# Patient Record
Sex: FEMALE | NOT HISPANIC OR LATINO | Employment: OTHER | URBAN - METROPOLITAN AREA
[De-identification: names, ages, dates, MRNs, and addresses within clinical notes are randomized per-mention and may not be internally consistent; named-entity substitution may affect disease eponyms.]

---

## 2023-10-23 ENCOUNTER — APPOINTMENT (OUTPATIENT)
Dept: PRIMARY CARE | Facility: CLINIC | Age: 48
End: 2023-10-23
Payer: COMMERCIAL

## 2023-11-28 ENCOUNTER — APPOINTMENT (OUTPATIENT)
Dept: PRIMARY CARE | Facility: CLINIC | Age: 48
End: 2023-11-28
Payer: COMMERCIAL

## 2023-11-29 ENCOUNTER — OFFICE VISIT (OUTPATIENT)
Dept: PRIMARY CARE | Facility: CLINIC | Age: 48
End: 2023-11-29
Payer: COMMERCIAL

## 2023-11-29 ENCOUNTER — ANCILLARY PROCEDURE (OUTPATIENT)
Dept: RADIOLOGY | Facility: CLINIC | Age: 48
End: 2023-11-29
Payer: COMMERCIAL

## 2023-11-29 VITALS
RESPIRATION RATE: 16 BRPM | OXYGEN SATURATION: 97 % | BODY MASS INDEX: 30.8 KG/M2 | TEMPERATURE: 97.9 F | WEIGHT: 152.8 LBS | DIASTOLIC BLOOD PRESSURE: 57 MMHG | HEIGHT: 59 IN | HEART RATE: 70 BPM | SYSTOLIC BLOOD PRESSURE: 137 MMHG

## 2023-11-29 DIAGNOSIS — M79.672 LEFT FOOT PAIN: ICD-10-CM

## 2023-11-29 DIAGNOSIS — E56.9 VITAMIN DEFICIENCY: ICD-10-CM

## 2023-11-29 DIAGNOSIS — E03.9 HYPOTHYROIDISM, UNSPECIFIED TYPE: Primary | ICD-10-CM

## 2023-11-29 PROCEDURE — 99214 OFFICE O/P EST MOD 30 MIN: CPT | Performed by: NURSE PRACTITIONER

## 2023-11-29 PROCEDURE — 73630 X-RAY EXAM OF FOOT: CPT | Mod: LT,FY

## 2023-11-29 PROCEDURE — 73630 X-RAY EXAM OF FOOT: CPT | Mod: LEFT SIDE | Performed by: RADIOLOGY

## 2023-11-29 RX ORDER — LEVOTHYROXINE SODIUM 100 UG/1
100 TABLET ORAL DAILY
COMMUNITY
End: 2023-11-29 | Stop reason: SDUPTHER

## 2023-11-29 RX ORDER — ESCITALOPRAM OXALATE 5 MG/1
5 TABLET ORAL DAILY
COMMUNITY
Start: 2021-05-17

## 2023-11-29 RX ORDER — ALPRAZOLAM 0.25 MG/1
0.25 TABLET ORAL
COMMUNITY
End: 2024-04-24 | Stop reason: SDUPTHER

## 2023-11-29 RX ORDER — LEVOTHYROXINE SODIUM 125 UG/1
125 TABLET ORAL DAILY
Qty: 90 TABLET | Refills: 0 | Status: SHIPPED | OUTPATIENT
Start: 2023-11-29

## 2023-11-29 RX ORDER — LEVOTHYROXINE SODIUM 125 UG/1
1 TABLET ORAL DAILY
COMMUNITY
Start: 2019-02-14 | End: 2023-11-29 | Stop reason: SDUPTHER

## 2023-11-29 RX ORDER — LEVOTHYROXINE SODIUM 100 UG/1
100 TABLET ORAL DAILY
Qty: 90 TABLET | Refills: 0 | Status: SHIPPED | OUTPATIENT
Start: 2023-11-29

## 2023-11-29 RX ORDER — ASPIRIN 325 MG
50000 TABLET, DELAYED RELEASE (ENTERIC COATED) ORAL WEEKLY
COMMUNITY
Start: 2022-09-30

## 2023-11-29 RX ORDER — METHYLPREDNISOLONE 4 MG/1
TABLET ORAL
Qty: 21 TABLET | Refills: 0 | Status: SHIPPED | OUTPATIENT
Start: 2023-11-29 | End: 2023-12-06

## 2023-11-29 ASSESSMENT — ENCOUNTER SYMPTOMS
FEVER: 0
COUGH: 0
FATIGUE: 0
WHEEZING: 0
LOSS OF SENSATION IN FEET: 0
TROUBLE SWALLOWING: 0
SORE THROAT: 0
SHORTNESS OF BREATH: 0
OCCASIONAL FEELINGS OF UNSTEADINESS: 0
PALPITATIONS: 0
WEAKNESS: 0
CHILLS: 0
DEPRESSION: 0
DIZZINESS: 0
NERVOUS/ANXIOUS: 1
NUMBNESS: 0

## 2023-11-29 ASSESSMENT — PATIENT HEALTH QUESTIONNAIRE - PHQ9
SUM OF ALL RESPONSES TO PHQ9 QUESTIONS 1 AND 2: 0
2. FEELING DOWN, DEPRESSED OR HOPELESS: NOT AT ALL
1. LITTLE INTEREST OR PLEASURE IN DOING THINGS: NOT AT ALL

## 2023-11-29 NOTE — PATIENT INSTRUCTIONS
Today you were seen for foot pain.  Your x-ray radiology report is pending, you will be called with any positive findings.  Rest, ice 15-20 minutes 3-4 times a day.  Start short burst of steroids as directed.  Check labs.   Follow up with PCP 2 weeks with any persisting symptoms, or sooner with any worsening pain or any additional concerns.   If developing any severe pain, fever/chills, extremity weakness proceed to emergency department for further evaluation.

## 2023-11-29 NOTE — PROGRESS NOTES
"Subjective   Patient ID: Julieta Leong is a 48 y.o. female who presents for Foot Pain.    HPI Pt is in office with chronic left foot pain. She states it started 4 months ago 7/2023. Patient states its pain is like walking on her bone and the pain radiates up the ankle. She took motrin with no relief. Pt wants blood work for thyroid levels.    48-year-old female presents today complaining of left foot pain.  She denies any injury or trauma.  She states her foot has been hurting for the past 4 months.  She feels like it is walking on bone.  She has tried foot wraps and stretching with little relief.  She has also tried Motrin with little relief.  She is also requesting refills of her thyroid medication, she states she is due to have her thyroid levels checked.  Patient states that she is UTD on PAP and mammogram    Review of Systems   Constitutional:  Negative for chills, fatigue and fever.   HENT:  Negative for congestion, sore throat and trouble swallowing.    Respiratory:  Negative for cough, shortness of breath and wheezing.    Cardiovascular:  Negative for chest pain and palpitations.   Musculoskeletal:         Foot pain   Neurological:  Negative for dizziness, weakness and numbness.   Psychiatric/Behavioral:  The patient is nervous/anxious.        Objective   /57 Comment: automated  Pulse 70   Temp 36.6 °C (97.9 °F) (Temporal)   Resp 16   Ht 1.499 m (4' 11\")   Wt 69.3 kg (152 lb 12.8 oz)   SpO2 97%   BMI 30.86 kg/m²     Physical Exam  Vitals and nursing note reviewed.   Constitutional:       General: She is not in acute distress.     Appearance: Normal appearance.   Neck:      Thyroid: No thyroid mass, thyromegaly or thyroid tenderness.   Cardiovascular:      Rate and Rhythm: Normal rate and regular rhythm.      Heart sounds: Normal heart sounds.   Pulmonary:      Effort: Pulmonary effort is normal.      Breath sounds: Normal breath sounds.   Musculoskeletal:      Comments: Left foot: No " erythema, edema, ecchymoses or other visible deformity noted.  Tenderness noted over left heel and fifth metatarsal.  Foot range of motion within normal limits.  Pedal pulse 2+, sensation intact, cap refill brisk   Skin:     General: Skin is warm and dry.   Psychiatric:         Mood and Affect: Mood normal.         Behavior: Behavior normal.         Assessment/Plan   Problem List Items Addressed This Visit    None  Visit Diagnoses         Codes    Hypothyroidism, unspecified type    -  Primary E03.9    Relevant Medications    levothyroxine (Synthroid, Levoxyl) 100 mcg tablet    levothyroxine (Synthroid, Levoxyl) 125 mcg tablet    Other Relevant Orders    Tsh With Reflex To Free T4 If Abnormal    Left foot pain     M79.672    Relevant Medications    methylPREDNISolone (Medrol Dospak) 4 mg tablets    Other Relevant Orders    XR foot left 3+ views    Vitamin deficiency     E56.9    Relevant Orders    Vitamin D 25-Hydroxy,Total (for eval of Vitamin D levels)    Vitamin B12    BMI 30.0-30.9,adult     Z68.30        Medications refilled, encouraged to check labs.  X-ray of left foot radiology report is pending.  Will treat with Medrol Dosepak.  Patient requesting refills of Xanax, discussed with patient that I do not prescribe controlled medications.  Will have patient follow-up with Dr. Durant to discuss medication and recheck foot.  Patient verbalized understanding and is in agreement with treatment plan.

## 2023-12-12 ENCOUNTER — APPOINTMENT (OUTPATIENT)
Dept: PRIMARY CARE | Facility: CLINIC | Age: 48
End: 2023-12-12
Payer: COMMERCIAL

## 2023-12-29 ENCOUNTER — APPOINTMENT (OUTPATIENT)
Dept: PRIMARY CARE | Facility: CLINIC | Age: 48
End: 2023-12-29
Payer: COMMERCIAL

## 2024-01-22 ENCOUNTER — APPOINTMENT (OUTPATIENT)
Dept: PRIMARY CARE | Facility: CLINIC | Age: 49
End: 2024-01-22
Payer: COMMERCIAL

## 2024-01-23 ENCOUNTER — APPOINTMENT (OUTPATIENT)
Dept: PRIMARY CARE | Facility: CLINIC | Age: 49
End: 2024-01-23
Payer: COMMERCIAL

## 2024-01-25 ENCOUNTER — APPOINTMENT (OUTPATIENT)
Dept: PRIMARY CARE | Facility: CLINIC | Age: 49
End: 2024-01-25
Payer: COMMERCIAL

## 2024-02-01 ENCOUNTER — OFFICE VISIT (OUTPATIENT)
Dept: PRIMARY CARE | Facility: CLINIC | Age: 49
End: 2024-02-01
Payer: COMMERCIAL

## 2024-02-01 VITALS
BODY MASS INDEX: 30.04 KG/M2 | WEIGHT: 149 LBS | SYSTOLIC BLOOD PRESSURE: 118 MMHG | OXYGEN SATURATION: 97 % | HEIGHT: 59 IN | HEART RATE: 63 BPM | DIASTOLIC BLOOD PRESSURE: 82 MMHG

## 2024-02-01 DIAGNOSIS — E03.8 HYPOTHYROIDISM DUE TO HASHIMOTO'S THYROIDITIS: ICD-10-CM

## 2024-02-01 DIAGNOSIS — M79.672 LEFT FOOT PAIN: ICD-10-CM

## 2024-02-01 DIAGNOSIS — D32.9 MENINGIOMA (MULTI): ICD-10-CM

## 2024-02-01 DIAGNOSIS — M72.2 PLANTAR FASCIITIS OF LEFT FOOT: ICD-10-CM

## 2024-02-01 DIAGNOSIS — E06.3 HYPOTHYROIDISM DUE TO HASHIMOTO'S THYROIDITIS: ICD-10-CM

## 2024-02-01 DIAGNOSIS — Z12.31 ENCOUNTER FOR SCREENING MAMMOGRAM FOR MALIGNANT NEOPLASM OF BREAST: ICD-10-CM

## 2024-02-01 DIAGNOSIS — F41.1 GENERALIZED ANXIETY DISORDER: ICD-10-CM

## 2024-02-01 PROBLEM — E03.9 HYPOTHYROIDISM: Status: ACTIVE | Noted: 2022-10-24

## 2024-02-01 PROBLEM — M25.50 PAIN IN JOINT, MULTIPLE SITES: Status: ACTIVE | Noted: 2022-10-24

## 2024-02-01 PROBLEM — D25.9 UTERINE FIBROID: Status: ACTIVE | Noted: 2024-02-01

## 2024-02-01 PROBLEM — R53.81 MALAISE AND FATIGUE: Status: ACTIVE | Noted: 2022-10-24

## 2024-02-01 PROBLEM — R23.8 LONGITUDINAL RIDGING OF NAIL: Status: ACTIVE | Noted: 2022-10-24

## 2024-02-01 PROBLEM — G56.02 CARPAL TUNNEL SYNDROME OF LEFT WRIST: Status: ACTIVE | Noted: 2022-10-24

## 2024-02-01 PROBLEM — E53.8 VITAMIN B12 DEFICIENCY: Status: ACTIVE | Noted: 2024-02-01

## 2024-02-01 PROBLEM — D64.9 ANEMIA: Status: ACTIVE | Noted: 2024-02-01

## 2024-02-01 PROBLEM — M67.80 TENDINOSIS: Status: ACTIVE | Noted: 2022-10-24

## 2024-02-01 PROBLEM — I82.411 DVT OF DEEP FEMORAL VEIN, RIGHT (MULTI): Status: ACTIVE | Noted: 2022-10-24

## 2024-02-01 PROBLEM — R53.83 MALAISE AND FATIGUE: Status: ACTIVE | Noted: 2022-10-24

## 2024-02-01 PROBLEM — E61.1 IRON DEFICIENCY: Status: ACTIVE | Noted: 2024-02-01

## 2024-02-01 PROBLEM — Z86.59 HISTORY OF OCD (OBSESSIVE COMPULSIVE DISORDER): Status: ACTIVE | Noted: 2024-02-01

## 2024-02-01 PROBLEM — Z86.718 HISTORY OF DVT OF LOWER EXTREMITY: Status: ACTIVE | Noted: 2024-02-01

## 2024-02-01 PROBLEM — R92.2 DENSE BREASTS: Status: ACTIVE | Noted: 2024-02-01

## 2024-02-01 PROBLEM — N60.02 CYST OF LEFT BREAST: Status: ACTIVE | Noted: 2024-02-01

## 2024-02-01 PROBLEM — R92.30 DENSE BREASTS: Status: ACTIVE | Noted: 2024-02-01

## 2024-02-01 PROBLEM — N92.0 MENORRHAGIA: Status: ACTIVE | Noted: 2024-02-01

## 2024-02-01 PROBLEM — R76.8 ANA POSITIVE: Status: ACTIVE | Noted: 2022-10-24

## 2024-02-01 PROBLEM — F41.9 ANXIETY DISORDER: Status: ACTIVE | Noted: 2023-08-01

## 2024-02-01 PROBLEM — N39.41 URGE INCONTINENCE: Status: ACTIVE | Noted: 2024-02-01

## 2024-02-01 PROBLEM — G89.29 CHRONIC FOOT PAIN, LEFT: Status: ACTIVE | Noted: 2023-06-01

## 2024-02-01 PROBLEM — E55.9 VITAMIN D DEFICIENCY: Status: ACTIVE | Noted: 2024-02-01

## 2024-02-01 PROBLEM — I26.99 PULMONARY EMBOLISM (MULTI): Status: ACTIVE | Noted: 2022-10-24

## 2024-02-01 PROCEDURE — 99214 OFFICE O/P EST MOD 30 MIN: CPT | Performed by: FAMILY MEDICINE

## 2024-02-01 RX ORDER — PREDNISONE 10 MG/1
TABLET ORAL
Qty: 30 TABLET | Refills: 0 | Status: SHIPPED | OUTPATIENT
Start: 2024-02-01 | End: 2024-05-25 | Stop reason: WASHOUT

## 2024-02-01 RX ORDER — DICLOFENAC SODIUM 75 MG/1
75 TABLET, DELAYED RELEASE ORAL 2 TIMES DAILY PRN
Qty: 60 TABLET | Refills: 1 | Status: SHIPPED | OUTPATIENT
Start: 2024-02-01 | End: 2024-04-01

## 2024-02-01 NOTE — PROGRESS NOTES
Subjective   Patient ID: Julieta Leong is a 48 y.o. female who presents for Establish Care and Foot Pain.    Pt c/o left foot pain, tingling sensation, pt states when she walks she feels a lump on the side of foot  X 6 months         Review of Systems  12 Systems have been reviewed as follows.  Constitutional: Fever, weight gain, weight loss, appetite change, night sweats, fatigue, chills.  Eyes : blurry, double vision, vision, loss, tearing, redness, pain, sensitivity to light, glaucoma.  Ears, nose, mouth, and throat: Hearing loss, ringing in the ears, ear pain, nasal congestion, nasal drainage, nosebleeds, mouth, throat, irritation tooth problem.  Cardiovascular :chest pain, pressure, heart racing, palpitations, sweating, leg swelling, high or low blood pressure  Pulmonary: Cough, yellow or green sputum, blood and sputum, shortness of breath, wheezing  Gastrointestinal: Nausea, vomiting, diarrhea, constipation, pain, blood in stool, or vomitus, heartburn, difficulty swallowing  Genitourinary: incontinence, abnormal bleeding, abnormal discharge, urinary frequency, urinary hesitancy, pain, impotence sexual problem, infection, urinary retention  Musculoskeletal: Pain, stiffness, joint, redness or warmth, arthritis, back pain, weakness, muscle wasting, sprain or fracture  Neuro: Weight weakness, dizziness, change in voice, change in taste change in vision, change in hearing, loss, or change of sensation, trouble walking, balance problems coordination problems, shaking, speech problem  Endocrine , cold or heat intolerance, blood sugar problem, weight gain or loss missed periods hot flashes, sweats, change in body hair, change in libido, increased thirst, increased urination  Heme/lymph: Swelling, bleeding, problem anemia, bruising, enlarged lymph nodes  Allergic/immunologic: H. plus nasal drip, watery itchy eyes, nasal drainage, immunosuppressed  The above were reviewed and noted negative except as noted in HPI  "and Problem List.    Objective   /82   Pulse 63   Ht 1.499 m (4' 11\")   Wt 67.6 kg (149 lb)   SpO2 97%   BMI 30.09 kg/m²     Physical Exam  Constitutional: Well developed, well nourished, alert and in no acute distress   Eyes: Normal external exam. Pupils equally round and reactive to light with normal accommodation and extraocular movements intact.  Neck: Supple, no lymphadenopathy or masses.   Cardiovascular: Regular rate and rhythm, normal S1 and S2, no murmurs, gallops, or rubs. Radial pulses normal. No peripheral edema.  Pulmonary: No respiratory distress, lungs clear to auscultation bilaterally. No wheezes, rhonchi, rales.  Abdomen: soft,non tender, non distended, without masses or HSM  Skin: Warm, well perfused, normal skin turgor and color.   Neurologic: Cranial nerves II-XII grossly intact.   Psychiatric: Mood calm and affect normal  Musculoskeletal: Moving all extremities without restriction    Assessment/Plan   Problem List Items Addressed This Visit             ICD-10-CM    Anxiety disorder F41.9    Relevant Orders    Follow Up In [facility] - PCP - Established    Hypothyroidism E03.9    Relevant Orders    Follow Up In [facility] - PCP - Established    Meningioma (CMS/HCC) D32.9    Encounter for screening mammogram for malignant neoplasm of breast Z12.31    Relevant Orders    BI mammo bilateral screening tomosynthesis    Follow Up In [facility] - PCP - Established     Other Visit Diagnoses         Codes    Left foot pain     M79.672    Relevant Medications    predniSONE (Deltasone) 10 mg tablet    diclofenac (Voltaren) 75 mg EC tablet    Other Relevant Orders    Follow Up In [facility] - PCP - Established    Plantar fasciitis of left foot     M72.2               Continue current medications and therapy for chronic medical conditions    Xray on left foot 11/25/23 wnl     Plantars fasciitis  Foot excercises/stretches discussed.   Ice the area " PRN.    Hypothyroidism is treated with levothyroxine 225 mg.    Start prednisone taper. When at the 1 tablet per day of the taper, start the diclofenac.     Mammogram ordered today.                Provider Attestation - Scribe documentation  All medical record entries made by the Scribe were at my direction and personally dictated by me. I have reviewed the chart and agree that the record accurately reflects my personal performance of the history, physical exam, discussion and plan.    Scribe Attestation  By signing my name below, I, Ronn Durant MD   , Scribe attest that this documentation has been prepared under the direction and in the presence of Ronn Durant MD.

## 2024-04-24 ENCOUNTER — PATIENT MESSAGE (OUTPATIENT)
Dept: PRIMARY CARE | Facility: CLINIC | Age: 49
End: 2024-04-24
Payer: COMMERCIAL

## 2024-04-24 DIAGNOSIS — F41.1 GENERALIZED ANXIETY DISORDER: Primary | ICD-10-CM

## 2024-04-24 NOTE — TELEPHONE ENCOUNTER
From: Julieta Leong  To: Ronn Durant  Sent: 4/24/2024 12:02 PM EDT  Subject: Xanax prescription refill     Can I get a refill on my Xanax please..my anxiety has been pretty bad lately

## 2024-04-27 RX ORDER — ALPRAZOLAM 0.25 MG/1
0.25 TABLET ORAL
Qty: 10 TABLET | Refills: 0 | Status: SHIPPED | OUTPATIENT
Start: 2024-04-27

## 2024-05-08 ENCOUNTER — TELEMEDICINE (OUTPATIENT)
Dept: PRIMARY CARE | Facility: CLINIC | Age: 49
End: 2024-05-08
Payer: COMMERCIAL

## 2024-05-08 DIAGNOSIS — J02.9 ACUTE PHARYNGITIS, UNSPECIFIED ETIOLOGY: Primary | ICD-10-CM

## 2024-05-08 PROCEDURE — 99213 OFFICE O/P EST LOW 20 MIN: CPT

## 2024-05-08 PROCEDURE — 3008F BODY MASS INDEX DOCD: CPT

## 2024-05-08 PROCEDURE — 87880 STREP A ASSAY W/OPTIC: CPT

## 2024-05-08 ASSESSMENT — ENCOUNTER SYMPTOMS
COUGH: 1
SORE THROAT: 1
HEADACHES: 0
TROUBLE SWALLOWING: 1

## 2024-05-09 ENCOUNTER — CLINICAL SUPPORT (OUTPATIENT)
Dept: PRIMARY CARE | Facility: CLINIC | Age: 49
End: 2024-05-09
Payer: COMMERCIAL

## 2024-05-09 ENCOUNTER — TELEMEDICINE (OUTPATIENT)
Dept: PRIMARY CARE | Facility: CLINIC | Age: 49
End: 2024-05-09
Payer: COMMERCIAL

## 2024-05-09 ENCOUNTER — TELEPHONE (OUTPATIENT)
Dept: PRIMARY CARE | Facility: CLINIC | Age: 49
End: 2024-05-09

## 2024-05-09 DIAGNOSIS — J02.9 SORE THROAT: ICD-10-CM

## 2024-05-09 DIAGNOSIS — J02.9 ACUTE PHARYNGITIS, UNSPECIFIED ETIOLOGY: ICD-10-CM

## 2024-05-09 DIAGNOSIS — R09.89 SYMPTOMS OF UPPER RESPIRATORY INFECTION (URI): Primary | ICD-10-CM

## 2024-05-09 LAB — POC RAPID STREP: NEGATIVE

## 2024-05-09 PROCEDURE — 99213 OFFICE O/P EST LOW 20 MIN: CPT | Performed by: NURSE PRACTITIONER

## 2024-05-09 PROCEDURE — 3008F BODY MASS INDEX DOCD: CPT | Performed by: NURSE PRACTITIONER

## 2024-05-09 PROCEDURE — 87651 STREP A DNA AMP PROBE: CPT

## 2024-05-09 ASSESSMENT — ENCOUNTER SYMPTOMS: SORE THROAT: 1

## 2024-05-09 NOTE — PROGRESS NOTES
Subjective   Patient ID: Julieta Leong is a 49 y.o. female who presents for Sore Throat.  Began with ST 4 days ago, cough, T 99 the following day.  No headache.  Some nasal symptoms and ST now  Was tested for strep, (-), culture pending  Using FastMax multi sx cold meds.  Does endorse some pnd.    Sore Throat   Associated symptoms include congestion. Pertinent negatives include no headaches.       Review of Systems   Constitutional: Negative.    HENT:  Positive for congestion, postnasal drip and sore throat.    Eyes: Negative.    Neurological:  Negative for headaches.       Objective   Physical Exam  Constitutional:       General: She is not in acute distress.     Appearance: She is not toxic-appearing.   HENT:      Nose:      Comments: Mild nasal congestion,rhinorrhea and pnd evident  Pulmonary:      Effort: Pulmonary effort is normal.   Musculoskeletal:      Cervical back: Normal range of motion.         Assessment/Plan   There are no diagnoses linked to this encounter.         WOOD Rodrigues-CNP 05/09/24 2:14 PM

## 2024-05-09 NOTE — PROGRESS NOTES
On Demand Virtual Visit Patient Consent     This visit was completed via video conference. All issues as below were discussed and addressed but no physical exam was performed. If it was felt that the patient should be evaluated in clinic than they were directed there. The patient verbally consented to the visit.    An interactive audio and video telecommunication system which permits real time communications between the patient (at the originating site) and provider (at the distant site) was utilized to provide this telehealth service.   Verbal consent was requested and obtained from Julieta Leong (or parent if under 18) 5/8/24 for a telehealth visit.   I have verbally confirmed with Julieta Leong (or parent if under 18) that they are physically located in the [facility] during this virtual visit.    Subjective   Patient ID: Julieta Leong is a 49 y.o. female who presents for URI.  Sore Throat   This is a new problem. Episode onset: 3days ago. The problem has been unchanged. Neither side of throat is experiencing more pain than the other. There has been no fever. The pain is moderate. Associated symptoms include coughing, ear pain and trouble swallowing. Pertinent negatives include no ear discharge or headaches. She has had exposure to strep. She has had no exposure to mono. Treatments tried: mucinex fast max. The treatment provided mild relief.       Review of Systems   HENT:  Positive for ear pain, sore throat and trouble swallowing. Negative for ear discharge.    Respiratory:  Positive for cough.    Neurological:  Negative for headaches.       Objective     There were no vitals taken for this visit.       Physical Exam pt seen from her home to be in no acute distress, pts son has recently recovered from strep she started having symptoms 3 days ago, explained how to get testing also included instructions in her AVS, red flags and need for inperson care reviewed.     Assessment/Plan   Julieta was  seen today for uri.  Diagnoses and all orders for this visit:  Acute pharyngitis, unspecified etiology  -     POCT rapid strep A manually resulted  -     Group A Streptococcus, PCR; Future

## 2024-05-09 NOTE — TELEPHONE ENCOUNTER
Responded to pts result via result management. Strep negative, throat is most likely post nasal drip.  Advised to RX for this would need supportive therapy such as increased fluids, herbal tea with honey, and decongetent if bp is well controlled.

## 2024-05-09 NOTE — PATIENT INSTRUCTIONS
Please call any of the offices below or your primary care to set up a nurse visit to have the throat swab completed.     [facility] [address], [facility] [phone]  Monday - Friday 9am to 4pm    [facility] [address] [place], [place] [phone]    Monday - Friday 9am to 3pm    [facility] [address]  [phone]    Monday-Friday 9am to 4pm

## 2024-05-10 LAB — S PYO DNA THROAT QL NAA+PROBE: NOT DETECTED

## 2024-05-25 PROBLEM — R09.89 SYMPTOMS OF UPPER RESPIRATORY INFECTION (URI): Status: ACTIVE | Noted: 2024-05-25

## 2024-05-25 PROBLEM — J02.9 SORE THROAT: Status: ACTIVE | Noted: 2024-05-25

## 2024-05-25 ASSESSMENT — ENCOUNTER SYMPTOMS
HEADACHES: 0
CONSTITUTIONAL NEGATIVE: 1
EYES NEGATIVE: 1

## 2024-05-25 NOTE — ASSESSMENT & PLAN NOTE
Hx is not consistent with strep A, RST (-), backup pending. Reviewed typical coures of illness, supportive care measures and symptoms to report that might indicate a secondary illness or infection.  Fluids, Chloraseptic spray can be helpful  Followup from provider ordering culture pending.

## 2024-08-26 ENCOUNTER — PATIENT MESSAGE (OUTPATIENT)
Dept: PRIMARY CARE | Facility: CLINIC | Age: 49
End: 2024-08-26
Payer: COMMERCIAL

## 2024-08-26 DIAGNOSIS — F41.1 GENERALIZED ANXIETY DISORDER: ICD-10-CM

## 2024-08-28 RX ORDER — ALPRAZOLAM 0.25 MG/1
0.25 TABLET ORAL
Qty: 10 TABLET | Refills: 0 | Status: SHIPPED | OUTPATIENT
Start: 2024-08-28

## 2024-09-19 ENCOUNTER — APPOINTMENT (OUTPATIENT)
Dept: PRIMARY CARE | Facility: CLINIC | Age: 49
End: 2024-09-19
Payer: COMMERCIAL

## 2024-10-03 ENCOUNTER — APPOINTMENT (OUTPATIENT)
Dept: PRIMARY CARE | Facility: CLINIC | Age: 49
End: 2024-10-03
Payer: COMMERCIAL

## 2025-02-25 DIAGNOSIS — E03.9 HYPOTHYROIDISM, UNSPECIFIED TYPE: ICD-10-CM

## 2025-02-25 RX ORDER — LEVOTHYROXINE SODIUM 125 UG/1
125 TABLET ORAL DAILY
Qty: 30 TABLET | Refills: 1 | Status: SHIPPED | OUTPATIENT
Start: 2025-02-25

## 2025-03-13 ENCOUNTER — APPOINTMENT (OUTPATIENT)
Dept: PRIMARY CARE | Facility: CLINIC | Age: 50
End: 2025-03-13
Payer: COMMERCIAL

## 2025-07-28 ENCOUNTER — APPOINTMENT (OUTPATIENT)
Dept: PRIMARY CARE | Facility: CLINIC | Age: 50
End: 2025-07-28
Payer: COMMERCIAL